# Patient Record
Sex: FEMALE | Race: WHITE | NOT HISPANIC OR LATINO | URBAN - METROPOLITAN AREA
[De-identification: names, ages, dates, MRNs, and addresses within clinical notes are randomized per-mention and may not be internally consistent; named-entity substitution may affect disease eponyms.]

---
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
## 2021-01-04 ENCOUNTER — APPOINTMENT (OUTPATIENT)
Dept: HEART AND VASCULAR | Facility: CLINIC | Age: 21
End: 2021-01-04
Payer: COMMERCIAL

## 2021-01-04 VITALS
DIASTOLIC BLOOD PRESSURE: 80 MMHG | OXYGEN SATURATION: 98 % | HEIGHT: 65 IN | BODY MASS INDEX: 15.83 KG/M2 | WEIGHT: 95 LBS | SYSTOLIC BLOOD PRESSURE: 118 MMHG | HEART RATE: 86 BPM | TEMPERATURE: 98.1 F

## 2021-01-04 DIAGNOSIS — R55 SYNCOPE AND COLLAPSE: ICD-10-CM

## 2021-01-04 DIAGNOSIS — I45.10 UNSPECIFIED RIGHT BUNDLE-BRANCH BLOCK: ICD-10-CM

## 2021-01-04 PROCEDURE — 99214 OFFICE O/P EST MOD 30 MIN: CPT

## 2021-01-04 PROCEDURE — 99072 ADDL SUPL MATRL&STAF TM PHE: CPT

## 2021-01-04 PROCEDURE — 93306 TTE W/DOPPLER COMPLETE: CPT

## 2021-01-05 NOTE — DISCUSSION/SUMMARY
[FreeTextEntry1] : Syncope/incomplete RBBB echo reviewed and results shared with the patient. She will follow up with PMD. My suspicion is that this is a vagal episode. I'm inclined to pursue a conservative approach and careful follow up

## 2021-01-05 NOTE — REASON FOR VISIT
[Follow-Up - Clinic] : a clinic follow-up of [Syncope] : syncope [FreeTextEntry1] : 19 yo F who denies pertinent PMH p/w witnessed syncope and falls from sitting on her chair onto the floor after consuming edibles containing THC. Patient was able to wake up on her own and ambulate at\par the scene but was concerned after hitting her head. Friend at bedside reports witnessing patient fall from chair height and waking up on her own after fall. Patient denies any vomiting, CP, SOB, frequent syncope, childhood history ofcardiovascular problems, family history of early cardiac death, history of malignancy, recent surgeries, LE swelling, history of DVT/PE, anticoagulation use, or suspicion for pregnancy. EKG remarkable for incomplete RBBB. We are discussing a cardiac work up after her echo today. She had another episode since last I saw her.

## 2021-03-23 ENCOUNTER — APPOINTMENT (OUTPATIENT)
Dept: HEART AND VASCULAR | Facility: CLINIC | Age: 21
End: 2021-03-23

## 2022-12-05 NOTE — ED ADULT NURSE NOTE - HOW OFTEN DO YOU HAVE A DRINK CONTAINING ALCOHOL?
Two to four times a month Cephalexin Pregnancy And Lactation Text: This medication is Pregnancy Category B and considered safe during pregnancy.  It is also excreted in breast milk but can be used safely for shorter doses.

## 2024-11-07 ENCOUNTER — NON-APPOINTMENT (OUTPATIENT)
Age: 24
End: 2024-11-07